# Patient Record
Sex: MALE | Race: WHITE | NOT HISPANIC OR LATINO | Employment: FULL TIME | URBAN - METROPOLITAN AREA
[De-identification: names, ages, dates, MRNs, and addresses within clinical notes are randomized per-mention and may not be internally consistent; named-entity substitution may affect disease eponyms.]

---

## 2021-06-10 ENCOUNTER — HOSPITAL ENCOUNTER (EMERGENCY)
Facility: CLINIC | Age: 46
Discharge: HOME | End: 2021-06-10
Attending: EMERGENCY MEDICINE | Admitting: EMERGENCY MEDICINE
Payer: COMMERCIAL

## 2021-06-10 VITALS
DIASTOLIC BLOOD PRESSURE: 78 MMHG | HEART RATE: 66 BPM | TEMPERATURE: 96.7 F | SYSTOLIC BLOOD PRESSURE: 130 MMHG | OXYGEN SATURATION: 98 % | RESPIRATION RATE: 18 BRPM

## 2021-06-10 DIAGNOSIS — M54.50 ACUTE LEFT-SIDED LOW BACK PAIN WITHOUT SCIATICA: ICD-10-CM

## 2021-06-10 PROCEDURE — 99283 EMERGENCY DEPT VISIT LOW MDM: CPT

## 2021-06-10 PROCEDURE — 250N000013 HC RX MED GY IP 250 OP 250 PS 637: Performed by: EMERGENCY MEDICINE

## 2021-06-10 RX ORDER — METHOCARBAMOL 750 MG/1
750 TABLET, FILM COATED ORAL 3 TIMES DAILY PRN
Qty: 15 TABLET | Refills: 0 | Status: SHIPPED | OUTPATIENT
Start: 2021-06-10 | End: 2021-06-15

## 2021-06-10 RX ORDER — METHOCARBAMOL 750 MG/1
750 TABLET, FILM COATED ORAL ONCE
Status: COMPLETED | OUTPATIENT
Start: 2021-06-10 | End: 2021-06-10

## 2021-06-10 RX ORDER — HYDROCODONE BITARTRATE AND ACETAMINOPHEN 5; 325 MG/1; MG/1
1 TABLET ORAL ONCE
Status: COMPLETED | OUTPATIENT
Start: 2021-06-10 | End: 2021-06-10

## 2021-06-10 RX ADMIN — METHOCARBAMOL 750 MG: 750 TABLET ORAL at 08:58

## 2021-06-10 RX ADMIN — HYDROCODONE BITARTRATE AND ACETAMINOPHEN 1 TABLET: 5; 325 TABLET ORAL at 08:58

## 2021-06-10 ASSESSMENT — ENCOUNTER SYMPTOMS
NUMBNESS: 1
BACK PAIN: 1

## 2021-06-10 NOTE — DISCHARGE INSTRUCTIONS
What do you do next:   Continue your home medications unless we have specifically changed them  You may use the exercises I have listed on your paperwork and see if they help.  Continue taking naproxen (Aleve) with food and follow the directions on the bottle.   Follow up as indicated below    When do you return: If you have uncontrollable pain, severe weakness, complete lack of sensation of any part of your leg, urinary or fecal incontinence, or any other symptoms that concern you, please return to the ED for reevaluation.    Thank you for allowing us to care for you today.

## 2021-06-10 NOTE — ED PROVIDER NOTES
History   Chief Complaint:  Back Pain       The history is provided by the patient.      Esdras Varela is a 46 year old male who presents with left sided back pain. He says that on Saturday he was changing the tires on his car, where he was squatting and lifting the tires. He then felt back soreness the next couple days and today while he was putting his pants on, he jerked his left leg upwards while hunching over and immediately felt lower left back pain and began feeling a sensation of numbness in his left leg (he denies pins or needles). He denies any right sided pains or numbness. He took 2 Aleve afterwards with relief of his pains, but the numbness has persisted.    Review of Systems   Musculoskeletal: Positive for back pain (left side).   Neurological: Positive for numbness (left leg).   All other systems reviewed and are negative.      Allergies:  The patient has no known allergies.     Medications:  Zoloft  Tenormin    Past Medical History:    Mitral click syndrome  Mitral valve prolapse  Mitral insufficiency  Ascending aorta dissection  Varicella  Heart murmur    Past Surgical History:    [place] teeth extraction    Family History:    Colon cancer  DVT/PE  Arrythmia  Pacemaker    Social History:  Patient was accompanied by his wife.  Patient came from home.    Physical Exam     Patient Vitals for the past 24 hrs:   BP Temp Temp src Pulse Resp SpO2   06/10/21 0915 -- -- -- -- -- 98 %   06/10/21 0900 -- -- -- -- -- 99 %   06/10/21 0845 -- -- -- -- -- 96 %   06/10/21 0830 130/78 -- -- 66 -- 99 %   06/10/21 0822 137/74 96.7  F (35.9  C) Temporal 62 18 100 %       Physical Exam  Constitutional: Vital signs reviewed as above.   Head: No external signs of trauma noted.  Eyes: Pupils are equal, round, and reactive to light.   Neck: No JVD noted  Cardiovascular: Normal rate, regular rhythm and normal heart sounds.  Murmur heard loudest at the lateral portion of the patient's left chest corresponding with his  mitral valve issue insufficiency. Equal B/L peripheral pulses.  Pulmonary/Chest: Effort normal and breath sounds normal. No respiratory distress. Patient has no wheezes. Patient has no rales.   Gastrointestinal: Soft. There is no tenderness.   Musculoskeletal/Extremities: No edema noted. Normal tone. No midline T or L spine tenderness. No reproducible left lumbar paraspinal tenderness  Neurological: Patient is alert and oriented to person, place, and time. Patient has normal strength.  No objective sensory deficit noted.   SLR negative B/L   Skin: Skin is warm and dry. There is no diaphoresis noted.   Psychiatric: The patient appears calm.      Emergency Department Course        Emergency Department Course:    Reviewed:  I reviewed nursing notes, vitals, past medical history and care everywhere    Assessments/Consults:     ED Course as of Kunal 10 0940   Thu Kunal 10, 2021   0835 Assessed the patient (obtained history and performed exam).          Interventions:    Medications   methocarbamol (ROBAXIN) tablet 750 mg (750 mg Oral Given 6/10/21 0858)   HYDROcodone-acetaminophen (NORCO) 5-325 MG per tablet 1 tablet (1 tablet Oral Given 6/10/21 0858)       Disposition:  The patient was discharged to home.       Impression & Plan       CMS Diagnoses: None    Medical Decision Making:  This 46-year-old male patient presents to the ED due to back pain.  Please see the HPI and exam for specifics. A broad differential was considered. The potential of a lumbar intervertebral disc herniation causing discomfort on his sciatic nerve was considered.  I do not see any red flags such as weakness, loss of reflexes, objective sensory loss, or reports of urinary incontinence.  The patient noted that he was already feeling better with some over-the-counter naproxen he tried at home though he started to have more symptoms after my physical exam.  He received the other medications listed above and improved.  I do not believe he needs x-ray  imaging at this time.  I think he should follow with his primary care clinic and certainly consider outpatient physical therapy follow-up.  He may return at anytime if he has any new or worsening symptoms.  Anticipatory guidance given to patient and wife prior to discharge.    Covid-19  Esdras Varela was evaluated during a global COVID-19 pandemic, which necessitated consideration that the patient might be at risk for infection with the SARS-CoV-2 virus that causes COVID-19.   Applicable protocols for evaluation were followed during the patient's care.        Diagnosis:    ICD-10-CM    1. Acute left-sided low back pain without sciatica  M54.5        Discharge Medications:  New Prescriptions    METHOCARBAMOL (ROBAXIN) 750 MG TABLET    Take 1 tablet (750 mg) by mouth 3 times daily as needed for muscle spasms       Scribe Disclosure:  I, Marco A Minaya, am serving as a scribe at 8:25 AM on 6/10/2021 to document services personally performed by Elbert Casillas DO based on my observations and the provider's statements to me.            Elbert Casillas DO  06/10/21 0970

## 2021-06-10 NOTE — ED TRIAGE NOTES
Pt arrives with c/o left sided back pain that started suddenly this morning while putting pants on. Patient reports now having some left sided numbness down into his left leg. Pt denies loss of bowel or bladder. Pt took 2 aleve this AM. ABCs intact.

## 2024-12-16 ENCOUNTER — TRANSCRIBE ORDERS (OUTPATIENT)
Dept: OTHER | Age: 49
End: 2024-12-16

## 2024-12-16 DIAGNOSIS — Z98.890 S/P MITRAL VALVE REPAIR: Primary | ICD-10-CM

## 2024-12-18 ENCOUNTER — HOSPITAL ENCOUNTER (OUTPATIENT)
Dept: CARDIAC REHAB | Facility: CLINIC | Age: 49
Discharge: HOME | End: 2024-12-18
Payer: COMMERCIAL

## 2024-12-18 PROCEDURE — 93798 PHYS/QHP OP CAR RHAB W/ECG: CPT

## 2024-12-18 PROCEDURE — 93797 PHYS/QHP OP CAR RHAB WO ECG: CPT

## 2024-12-20 ENCOUNTER — HOSPITAL ENCOUNTER (EMERGENCY)
Facility: CLINIC | Age: 49
Discharge: HOME | End: 2024-12-21
Attending: EMERGENCY MEDICINE | Admitting: FAMILY MEDICINE
Payer: COMMERCIAL

## 2024-12-20 ENCOUNTER — APPOINTMENT (OUTPATIENT)
Dept: GENERAL RADIOLOGY | Facility: CLINIC | Age: 49
End: 2024-12-20
Attending: EMERGENCY MEDICINE
Payer: COMMERCIAL

## 2024-12-20 ENCOUNTER — HOSPITAL ENCOUNTER (EMERGENCY)
Facility: CLINIC | Age: 49
Discharge: OTHER INSTITUTION | End: 2024-12-20
Attending: EMERGENCY MEDICINE | Admitting: EMERGENCY MEDICINE
Payer: COMMERCIAL

## 2024-12-20 ENCOUNTER — APPOINTMENT (OUTPATIENT)
Dept: CT IMAGING | Facility: CLINIC | Age: 49
End: 2024-12-20
Attending: EMERGENCY MEDICINE
Payer: COMMERCIAL

## 2024-12-20 ENCOUNTER — HOSPITAL ENCOUNTER (OUTPATIENT)
Dept: CARDIAC REHAB | Facility: CLINIC | Age: 49
Discharge: HOME | End: 2024-12-20
Attending: THORACIC SURGERY (CARDIOTHORACIC VASCULAR SURGERY)
Payer: COMMERCIAL

## 2024-12-20 VITALS
SYSTOLIC BLOOD PRESSURE: 103 MMHG | DIASTOLIC BLOOD PRESSURE: 52 MMHG | WEIGHT: 227.74 LBS | RESPIRATION RATE: 18 BRPM | HEIGHT: 70 IN | TEMPERATURE: 97.8 F | BODY MASS INDEX: 32.6 KG/M2 | HEART RATE: 75 BPM | OXYGEN SATURATION: 93 %

## 2024-12-20 DIAGNOSIS — R93.89 ABNORMAL CXR: ICD-10-CM

## 2024-12-20 DIAGNOSIS — H53.122 TRANSIENT VISUAL LOSS OF LEFT EYE: ICD-10-CM

## 2024-12-20 DIAGNOSIS — Z79.01 CHRONIC ANTICOAGULATION: ICD-10-CM

## 2024-12-20 DIAGNOSIS — D64.9 ANEMIA, UNSPECIFIED TYPE: ICD-10-CM

## 2024-12-20 DIAGNOSIS — I63.40 CEREBRAL INFARCTION DUE TO EMBOLISM OF CEREBRAL ARTERY (H): ICD-10-CM

## 2024-12-20 DIAGNOSIS — H53.121 TRANSIENT VISUAL LOSS OF RIGHT EYE: ICD-10-CM

## 2024-12-20 LAB
ABO + RH BLD: NORMAL
ANION GAP SERPL CALCULATED.3IONS-SCNC: 12 MMOL/L (ref 7–15)
ATRIAL RATE - MUSE: 71 BPM
BASOPHILS # BLD AUTO: 0.1 10E3/UL (ref 0–0.2)
BASOPHILS NFR BLD AUTO: 1 %
BLD GP AB SCN SERPL QL: NEGATIVE
BUN SERPL-MCNC: 15 MG/DL (ref 6–20)
CALCIUM SERPL-MCNC: 9.5 MG/DL (ref 8.8–10.4)
CHLORIDE SERPL-SCNC: 98 MMOL/L (ref 98–107)
CREAT SERPL-MCNC: 1.24 MG/DL (ref 0.67–1.17)
DIASTOLIC BLOOD PRESSURE - MUSE: NORMAL MMHG
EGFRCR SERPLBLD CKD-EPI 2021: 70 ML/MIN/1.73M2
EOSINOPHIL # BLD AUTO: 0.1 10E3/UL (ref 0–0.7)
EOSINOPHIL NFR BLD AUTO: 1 %
ERYTHROCYTE [DISTWIDTH] IN BLOOD BY AUTOMATED COUNT: 13.5 % (ref 10–15)
GLUCOSE SERPL-MCNC: 91 MG/DL (ref 70–99)
HCO3 SERPL-SCNC: 25 MMOL/L (ref 22–29)
HCT VFR BLD AUTO: 24.8 % (ref 40–53)
HGB BLD-MCNC: 10.2 G/DL (ref 13.3–17.7)
HGB BLD-MCNC: 8.2 G/DL (ref 13.3–17.7)
HOLD SPECIMEN: NORMAL
IMM GRANULOCYTES # BLD: 0 10E3/UL
IMM GRANULOCYTES NFR BLD: 0 %
INR PPP: 1.98 (ref 0.85–1.15)
INR PPP: 2.06 (ref 0.85–1.15)
INTERPRETATION ECG - MUSE: NORMAL
LYMPHOCYTES # BLD AUTO: 1.5 10E3/UL (ref 0.8–5.3)
LYMPHOCYTES NFR BLD AUTO: 19 %
MCH RBC QN AUTO: 31.3 PG (ref 26.5–33)
MCHC RBC AUTO-ENTMCNC: 33.1 G/DL (ref 31.5–36.5)
MCV RBC AUTO: 95 FL (ref 78–100)
MONOCYTES # BLD AUTO: 0.8 10E3/UL (ref 0–1.3)
MONOCYTES NFR BLD AUTO: 10 %
NEUTROPHILS # BLD AUTO: 5.4 10E3/UL (ref 1.6–8.3)
NEUTROPHILS NFR BLD AUTO: 68 %
NRBC # BLD AUTO: 0 10E3/UL
NRBC BLD AUTO-RTO: 0 /100
P AXIS - MUSE: 43 DEGREES
PLATELET # BLD AUTO: 406 10E3/UL (ref 150–450)
POTASSIUM SERPL-SCNC: 4.2 MMOL/L (ref 3.4–5.3)
PR INTERVAL - MUSE: 202 MS
QRS DURATION - MUSE: 106 MS
QT - MUSE: 424 MS
QTC - MUSE: 460 MS
R AXIS - MUSE: -33 DEGREES
RBC # BLD AUTO: 2.62 10E6/UL (ref 4.4–5.9)
SODIUM SERPL-SCNC: 135 MMOL/L (ref 135–145)
SPECIMEN EXP DATE BLD: NORMAL
SYSTOLIC BLOOD PRESSURE - MUSE: NORMAL MMHG
T AXIS - MUSE: 108 DEGREES
TROPONIN T SERPL HS-MCNC: 108 NG/L
TROPONIN T SERPL HS-MCNC: 127 NG/L
TROPONIN T SERPL HS-MCNC: 134 NG/L
VENTRICULAR RATE- MUSE: 71 BPM
WBC # BLD AUTO: 7.9 10E3/UL (ref 4–11)

## 2024-12-20 PROCEDURE — 250N000013 HC RX MED GY IP 250 OP 250 PS 637: Performed by: EMERGENCY MEDICINE

## 2024-12-20 PROCEDURE — 83718 ASSAY OF LIPOPROTEIN: CPT | Performed by: EMERGENCY MEDICINE

## 2024-12-20 PROCEDURE — 93798 PHYS/QHP OP CAR RHAB W/ECG: CPT

## 2024-12-20 PROCEDURE — 99207 PR NO CHARGE LOS: CPT | Performed by: NURSE PRACTITIONER

## 2024-12-20 PROCEDURE — 250N000011 HC RX IP 250 OP 636: Performed by: EMERGENCY MEDICINE

## 2024-12-20 PROCEDURE — 250N000009 HC RX 250: Performed by: EMERGENCY MEDICINE

## 2024-12-20 PROCEDURE — 99285 EMERGENCY DEPT VISIT HI MDM: CPT | Mod: 25

## 2024-12-20 PROCEDURE — 93005 ELECTROCARDIOGRAM TRACING: CPT | Mod: RTG

## 2024-12-20 PROCEDURE — 85004 AUTOMATED DIFF WBC COUNT: CPT | Performed by: EMERGENCY MEDICINE

## 2024-12-20 PROCEDURE — 70450 CT HEAD/BRAIN W/O DYE: CPT

## 2024-12-20 PROCEDURE — 84484 ASSAY OF TROPONIN QUANT: CPT | Performed by: EMERGENCY MEDICINE

## 2024-12-20 PROCEDURE — 70496 CT ANGIOGRAPHY HEAD: CPT | Mod: 59

## 2024-12-20 PROCEDURE — 76512 OPH US DX B-SCAN: CPT | Mod: 26 | Performed by: FAMILY MEDICINE

## 2024-12-20 PROCEDURE — 71046 X-RAY EXAM CHEST 2 VIEWS: CPT

## 2024-12-20 PROCEDURE — 85041 AUTOMATED RBC COUNT: CPT | Performed by: EMERGENCY MEDICINE

## 2024-12-20 PROCEDURE — 76512 OPH US DX B-SCAN: CPT | Mod: RT | Performed by: FAMILY MEDICINE

## 2024-12-20 PROCEDURE — 99285 EMERGENCY DEPT VISIT HI MDM: CPT | Mod: 25 | Performed by: FAMILY MEDICINE

## 2024-12-20 PROCEDURE — 36415 COLL VENOUS BLD VENIPUNCTURE: CPT | Performed by: EMERGENCY MEDICINE

## 2024-12-20 PROCEDURE — 86901 BLOOD TYPING SEROLOGIC RH(D): CPT | Performed by: EMERGENCY MEDICINE

## 2024-12-20 PROCEDURE — 85610 PROTHROMBIN TIME: CPT | Performed by: EMERGENCY MEDICINE

## 2024-12-20 PROCEDURE — 99284 EMERGENCY DEPT VISIT MOD MDM: CPT | Mod: 25 | Performed by: FAMILY MEDICINE

## 2024-12-20 PROCEDURE — 82465 ASSAY BLD/SERUM CHOLESTEROL: CPT | Performed by: EMERGENCY MEDICINE

## 2024-12-20 PROCEDURE — 85018 HEMOGLOBIN: CPT | Performed by: EMERGENCY MEDICINE

## 2024-12-20 PROCEDURE — 85048 AUTOMATED LEUKOCYTE COUNT: CPT | Performed by: EMERGENCY MEDICINE

## 2024-12-20 PROCEDURE — 80048 BASIC METABOLIC PNL TOTAL CA: CPT | Performed by: EMERGENCY MEDICINE

## 2024-12-20 RX ORDER — METOPROLOL TARTRATE 25 MG/1
25 TABLET, FILM COATED ORAL 2 TIMES DAILY
COMMUNITY
Start: 2024-12-12 | End: 2025-01-11

## 2024-12-20 RX ORDER — METOPROLOL TARTRATE 25 MG/1
25 TABLET, FILM COATED ORAL ONCE
Status: COMPLETED | OUTPATIENT
Start: 2024-12-20 | End: 2024-12-20

## 2024-12-20 RX ORDER — WARFARIN SODIUM 2 MG/1
TABLET ORAL
COMMUNITY
Start: 2024-12-12

## 2024-12-20 RX ORDER — AMIODARONE HYDROCHLORIDE 200 MG/1
200 TABLET ORAL DAILY
COMMUNITY
Start: 2024-12-13 | End: 2025-01-09

## 2024-12-20 RX ORDER — IOPAMIDOL 755 MG/ML
500 INJECTION, SOLUTION INTRAVASCULAR ONCE
Status: COMPLETED | OUTPATIENT
Start: 2024-12-20 | End: 2024-12-20

## 2024-12-20 RX ORDER — WARFARIN SODIUM 1 MG/1
4 TABLET ORAL ONCE
Status: COMPLETED | OUTPATIENT
Start: 2024-12-20 | End: 2024-12-20

## 2024-12-20 RX ORDER — COLCHICINE 0.6 MG/1
0.3 TABLET ORAL DAILY
COMMUNITY
Start: 2024-12-12

## 2024-12-20 RX ORDER — ASPIRIN 81 MG/1
81 TABLET, CHEWABLE ORAL DAILY
COMMUNITY
Start: 2024-12-13

## 2024-12-20 RX ORDER — TRAZODONE HYDROCHLORIDE 50 MG/1
50-100 TABLET, FILM COATED ORAL AT BEDTIME
COMMUNITY
Start: 2024-12-16

## 2024-12-20 RX ORDER — ACETAMINOPHEN 500 MG
1000 TABLET ORAL ONCE
Status: COMPLETED | OUTPATIENT
Start: 2024-12-20 | End: 2024-12-20

## 2024-12-20 RX ADMIN — WARFARIN SODIUM 4 MG: 1 TABLET ORAL at 21:08

## 2024-12-20 RX ADMIN — ACETAMINOPHEN 1000 MG: 500 TABLET, FILM COATED ORAL at 16:15

## 2024-12-20 RX ADMIN — IOPAMIDOL 67 ML: 755 INJECTION, SOLUTION INTRAVENOUS at 12:52

## 2024-12-20 RX ADMIN — SODIUM CHLORIDE 100 ML: 9 INJECTION, SOLUTION INTRAVENOUS at 12:52

## 2024-12-20 RX ADMIN — METOPROLOL TARTRATE 25 MG: 25 TABLET, FILM COATED ORAL at 22:41

## 2024-12-20 ASSESSMENT — ACTIVITIES OF DAILY LIVING (ADL)
ADLS_ACUITY_SCORE: 41

## 2024-12-20 ASSESSMENT — COLUMBIA-SUICIDE SEVERITY RATING SCALE - C-SSRS
2. HAVE YOU ACTUALLY HAD ANY THOUGHTS OF KILLING YOURSELF IN THE PAST MONTH?: NO
6. HAVE YOU EVER DONE ANYTHING, STARTED TO DO ANYTHING, OR PREPARED TO DO ANYTHING TO END YOUR LIFE?: NO
1. IN THE PAST MONTH, HAVE YOU WISHED YOU WERE DEAD OR WISHED YOU COULD GO TO SLEEP AND NOT WAKE UP?: NO

## 2024-12-20 NOTE — ED NOTES
Pt being transferred to the [facility] via [facility]. Vitally stable, alert, oriented and ambulatory at time of transfer.

## 2024-12-20 NOTE — ED NOTES
Pt resting in bed after imaging completed. Appears comfortable at this time, family member at bedside.

## 2024-12-20 NOTE — CONSULTS
"  [facility]    Stroke Telephone Note    I was called by Pancho Palomo on 12/20/24 regarding patient Esdras Varela. The patient is a 49 year old male with PMH of recent AAA repair + AV replacement ([place], discharged 12/5) on Coumadin. He presents to the ED for evaluation of visual disturbance x2. The first episode occurred yesterday afternoon when he lost vision in the inferior half of his L eye only; symptoms resolved after approximately 5 minutes. Then today around 0920 he had another episode of inferior visual loss in L eye only; this also resolved after a couple minutes. He denies other symptoms. Per ED provider no deficits on current exam. BP 97/66.    Vitals  BP: 97/66   Pulse: 70   Resp: 18   Temp: 97.8  F (36.6  C)   Weight: 103.3 kg (227 lb 11.8 oz)    Imaging Findings  Pending     Impression  Inferior visual loss in L eye x 2    Recommendations  -CT head + CTA head/neck    Case discussed with vascular neurology attending Dr. Hardy.    My recommendations are based on the information provided over the phone by Esdras Varela's in-person providers. They are not intended to replace the clinical judgment of his in-person providers. I was not requested to personally see or examine the patient at this time.     LOVE Mueller CNP  Vascular Neurology    To page me or covering stroke neurology team member, click here: AMCOM  Choose \"On Call\" tab at top, then select \"NEUROLOGY/ALL SITES\" from middle drop-down box, press Enter, then look for \"stroke\" or \"telestroke\" for your site.   "

## 2024-12-20 NOTE — ED TRIAGE NOTES
Patient arrives to ED with complains of a sudden loss of vision in his lower L eye. At 9:30 AM. Of note, patient reports a significant cardiac history including open heart surgery 12/5/24, aortic valve replacement, and mitral valve replacement. Patient is pale and dizzy in triage. On anticoagulation. MD evaluated in triage.

## 2024-12-20 NOTE — ED PROVIDER NOTES
Emergency Department Note      History of Present Illness     Chief Complaint   Loss of Vision      HPI   Esdras Varela is a 49 year old male, anticoagulated on Coumadin, with a history of aneurysm of ascending aorta, dilated aortic root, left bundle branch block, CHF, mitral valve prolapse, and heart murmur who presents to the ED for loss of vision. The patient reports having a 5 minute episode of vision loss in the lower left eye yesterday. He adds that he still had vision in his upper left eye, and the vision returned to his lower left eye after walking around for 5 minutes. Patient then had another episode of vision loss in his lower left eye today at 0920/0930 while he was at his cardiac rehab appointment. Patient states that he currently feels fine and has no symptoms. He denies any numbness/weakness to bilateral upper/lower extremities, headache, or neck pain, though he does note he gets mildly lightheaded when standing. Further denies any history of similar symptoms or history of stroke. He does report recent heart surgery at [facility], and states he is on coumadin. Last INR from yesterday is 1.9. Patient wears reading glasses, but no contact lenses.    Independent Historian   None    Review of External Notes   Reviewed hospital discharge summary from [facility] on 12/12/2024 where patient underwent aortic repair, mitral and aortic valve replacement    Past Medical History     Medical History and Problem List   Thrombocytopenia secondary  Anemia  Aneurysm of ascending aorta  Dilated aortic root  Atelectasis  Left bundle branch block  CHF ejection fraction less than 40%  Excess fluid volume  GERD  Hypertensive heart disease  Hypokalemia  Mitral valve prolapse  Heart murmur  Nonrheumatic mitral valve insufficiency  Performance anxiety  Pleural effusion  Mitral click-murmur syndrome  Varicella    Medications  "  Tylenol  Pacerone  ASA  Tenormin  Colcrys  Lasix  Neurontin  Ativan  Lopressor  Desyrel  Coumadin     Surgical History   [place] tooth extraction   Repair mitral valve  Repair aneurysm ascending aorta - valve sparing root  Isolation pulmonary vein  Ligation left atrial appendage  Echocardiogram transesophageal  [place] root replacement - aortic valve    Physical Exam     Patient Vitals for the past 24 hrs:   BP Temp Temp src Pulse Resp SpO2 Height Weight   12/20/24 1615 98/58 -- -- 76 18 100 % -- --   12/20/24 1509 -- -- -- -- -- 98 % -- --   12/20/24 1508 -- -- -- -- -- 97 % -- --   12/20/24 1507 -- -- -- -- -- 97 % -- --   12/20/24 1506 -- -- -- -- -- 98 % -- --   12/20/24 1505 -- -- -- -- -- 97 % -- --   12/20/24 1504 -- -- -- -- -- 98 % -- --   12/20/24 1455 100/61 -- -- 69 18 99 % -- --   12/20/24 1330 98/61 -- -- 69 16 98 % -- --   12/20/24 1309 97/50 -- -- 78 18 97 % -- --   12/20/24 1225 101/59 -- -- 71 18 100 % -- --   12/20/24 1112 97/66 -- -- 70 18 100 % -- --   12/20/24 1059 107/66 97.8  F (36.6  C) Temporal 64 20 97 % 1.778 m (5' 10\") 103.3 kg (227 lb 11.8 oz)     Physical Exam  General:   Well-nourished   Speaking in full sentences  Eyes:   Conjunctiva without injection or scleral icterus   Pupils: 4 on R and 4 on L, round, reactive to light and accomodation   No field cut on confrontation field testing  ENT:   Moist mucous membranes   Posterior oropharynx clear without erythema or exudate  Neck:   Supple with full ROM  Resp:   Lungs CTAB   No crackles, wheezing or audible rubs   Good air movement  CV:    Normal rate, regular rhythm   S1 and S2 present   No murmur, gallop or rub  GI:   BS present   Abdomen soft without distention   Non-tender to light and deep palpation   No guarding or rebound tenderness  Skin:   Warm, dry, well perfused   No rashes or open wounds on exposed skin   Well healed upper abdominal and chest scars  MSK:   Moves all extremities   No focal deformities or " swelling  Neuro:   Alert   CN III-XII intact   5/5 strength bilaterally to hand , elbow flexion/extension   5/5 strength bilaterally to ankle dorsi/plantarflexion, hip flexion   SILT in BUE and BLE   Normal gait  Psych:   Normal affect, normal mood        Diagnostics     Lab Results   Labs Ordered and Resulted from Time of ED Arrival to Time of ED Departure   INR - Abnormal       Result Value    INR 1.98 (*)    BASIC METABOLIC PANEL - Abnormal    Sodium 135      Potassium 4.2      Chloride 98      Carbon Dioxide (CO2) 25      Anion Gap 12      Urea Nitrogen 15.0      Creatinine 1.24 (*)     GFR Estimate 70      Calcium 9.5      Glucose 91     TROPONIN T, HIGH SENSITIVITY - Abnormal    Troponin T, High Sensitivity 134 (*)    CBC WITH PLATELETS AND DIFFERENTIAL - Abnormal    WBC Count 7.9      RBC Count 2.62 (*)     Hemoglobin 8.2 (*)     Hematocrit 24.8 (*)     MCV 95      MCH 31.3      MCHC 33.1      RDW 13.5      Platelet Count 406      % Neutrophils 68      % Lymphocytes 19      % Monocytes 10      % Eosinophils 1      % Basophils 1      % Immature Granulocytes 0      NRBCs per 100 WBC 0      Absolute Neutrophils 5.4      Absolute Lymphocytes 1.5      Absolute Monocytes 0.8      Absolute Eosinophils 0.1      Absolute Basophils 0.1      Absolute Immature Granulocytes 0.0      Absolute NRBCs 0.0     TROPONIN T, HIGH SENSITIVITY - Abnormal    Troponin T, High Sensitivity 127 (*)    HEMOGLOBIN - Abnormal    Hemoglobin 10.2 (*)    TYPE AND SCREEN, ADULT    ABO/RH(D) O POS      Antibody Screen Negative      SPECIMEN EXPIRATION DATE [id number]     ABO/RH TYPE AND SCREEN       Imaging   Chest XR,  PA & LAT   Final Result   IMPRESSION: Patchy bibasilar opacities left greater than right may   represent atelectasis. Correlate clinically with any evidence of   airspace disease such as pneumonia. Borderline cardiomegaly.   Sternotomy changes. No effusion or pneumothorax.      TONY MENSAH MD            SYSTEM ID:   [id number]      CTA Head Neck with Contrast   Final Result   IMPRESSION: Patent arteries in the head and neck without vascular   cutoff. No evidence of dissection. No aneurysm identified. No   significant stenosis.      JO GALINDO MD            SYSTEM ID:  [id number]      Head CT w/o contrast   Final Result   IMPRESSION:   No evidence of acute intracranial hemorrhage, mass, or   herniation.       JO GALINDO MD            SYSTEM ID:  [id number]      POC US SOFT TISSUE   Final Result      Limited Bedside ED Ultrasound for Ocular complaints Procedure Note:      PROCEDURE: PERFORMED BY: Dr. Pancho Palomo MD   INDICATIONS/SYMPTOM: visual field cut   PROBE: High frequency linear probe   FINDINGS:   Left eye:       Lens location: Normal    Retinal detachment: Absent    Posterior chamber hemorrhage: Absent    Intraocular foreign body: Absent      INTERPRETATION: No evidence of retinal detachment on bedside US   IMAGE DOCUMENTATION: Images were archived to PACs system.                EKG   ECG taken at 1109, ECG read at 1111  Normal sinus rhythm with sinus arrhythmia  Left axis deviation  Nonspecific T wave abnormality  Abnormal ECG   No significant change as compared to prior, dated 12/12/24.  Rate 71 bpm. SC interval 202 ms. QRS duration 106 ms. QT/QTc 424/460 ms. P-R-T axes 43 -33 108.    Independent Interpretation   CXR: No pneumothorax or pleural effusion.  CT Head: No intracranial hemorrhage.    ED Course      Medications Administered   Medications   sodium chloride 0.9 % bag 100 mL for CT scan flush use (100 mLs As instructed $Given 12/20/24 1252)   iopamidol (ISOVUE-370) solution 500 mL (67 mLs Intravenous $Given 12/20/24 1252)   acetaminophen (TYLENOL) tablet 1,000 mg (1,000 mg Oral $Given 12/20/24 1615)       Procedures   Procedures     Discussion of Management   See below.    ED Course   ED Course as of 12/20/24 1701   Fri Dec 20, 2024   1055 I obtained history and examined the patient as noted above.      1122 I spoke with Marilyn Cid of stroke neuro regarding the patient's presentation and plan of care.   1213 Patient reevaluated.  Bedside ultrasound performed.  No evidence of retinal tear or posterior vitreous hemorrhage.  He denies any symptoms of chest pain, chest pressure no shortness of breath.  Bedside ultrasound reveals trace pericardial effusion though no RV dilation.  No free intra-abdominal fluid.   1328 I spoke with Shoaib of [facility]acic Surgery Team regarding the patient's presentation and plan of care.   1411 I spoke with Marilyn Cid of strok neuro regarding patient's presentation and plan of care.   1457 I spoke with Marilyn Cid of strok neuro regarding patient's presentation and plan of care.   1517 I spoke with Dr. Alexandre Johnson of [facility] regarding patient's IN system transfer.   1519 I rechecked the patient and updated them on IN system transfer.   1557 I rechecked the patient and discussed different possible placements for transfer. Patient states he is unable to transfer to [facility] because it is not covered by his insurance.   1604 I spoke with [facility] regarding patient's IN system transfer. They denied patient transfer.   1629 Spoke with [facility] ED.  They will call back   1642 [facility] unable to accept patient in transfer.  Patient again confirmed with his insurance company and acknowledges that this should be covered to go to the [facility]       Additional Documentation  None    Medical Decision Making / Diagnosis     CMS Diagnoses: None    MIPS       None    [facility]   Esdras Varela is a 49 year old male with a complex past medical history presenting to the emergency department for evaluation of vision loss.  VS on presentation reveal no acute abnormalities.  At the time of my evaluation, patient symptoms had resolved completely.  No additional neurologic symptoms are noted.  CT/CTA was pursued, revealing no evidence of ICH, large vessel occlusion or  other acute abnormality.  I reviewed case and clinical history with stroke neurology.  Greatly appreciate their assistance.  Please refer to their associated documentation.  Differential diagnosis does include retinal ischemia, ischemic optic neuropathy, retinal pathology, as well as CVA/TIA.  At this time, recommendations have been made for transfer to a facility with ophthalmologic evaluation for detailed retinal exam for further evaluation.  Further consideration for CNS ischemia can be undertaken with brain MRI.  From a cardiothoracic standpoint, the patient denies any symptoms of chest pain, chest pressure nor shortness of breath.  EKG demonstrates sinus rhythm, T wave inversion in lead I, aVL, as well as T wave inversion in lead V2, though these are not grossly changed compared with previous.  Initial high-sensitivity troponin did return elevated at 134, with repeat testing downtrending at 127.  Clinically, I suspect this most likely is secondary to recent cardiothoracic surgery, and less likely related to ACS in the absence of symptoms as well as above history.  This can be further trended.  Chest x-ray was also obtained, demonstrating patchy bibasilar opacities, left greater than right which may represent atelectasis.  On review of outside records, similar infiltrates had been noted on x-ray from 12/16/2024.  In the absence of cough, fever, or other infectious symptoms, low suspicion for pneumonia.  His laboratory evaluation is notable for hemoglobin of 8.2, down from previous values near 10.  On recheck, this did improved to 10.2, for which I question if this may have been a spurious level.  No other signs or symptoms of acute blood loss anemia or detected by history or examination.    Results and clinical pression discussed with patient and spouse present at bedside.  Unfortunately, in the absence of cardiothoracic or ophthalmology specialties at this facility, patient will require transfer.  We initially  secured a bed in the ED at the [facility] who has graciously accepted the patient in transfer.  There was initial concern over whether or not his insurance would cover this as in-network, for which additional hospitals were contacted regarding the possibility of transfer.  Ultimately, patient is connected with his insurance company who affirmed that the [facility] should be within network.  As such, will plan transfer to ED via EMS for further evaluation and care.  Case discussed with patient and spouse as well as accepting ED physician.    Disposition   The patient was transferred to [facility] via EMS. Dr. Castillo accepted the patient for transfer.     Diagnosis     ICD-10-CM    1. Transient visual loss of left eye  H53.122       2. Chronic anticoagulation  Z79.01       3. Anemia, unspecified type  D64.9       4. Abnormal CXR  R93.89            Scribe Disclosure:  I, Ning Saravia, am serving as a scribe at 11:44 AM on 12/20/2024 to document services personally performed by Pancho Palomo MD based on my observations and the provider's statements to me.        Pancho Palomo MD  12/20/24 1701       Pancho Palomo MD  12/20/24 1701       Pancho Palomo MD  12/20/24 1908

## 2024-12-20 NOTE — PHARMACY-ADMISSION MEDICATION HISTORY
Pharmacy Intern Admission Medication History    Admission medication history is complete. The information provided in this note is only as accurate as the sources available at the time of the update.    Information Source(s): Patient, Hospital records, and CareEverywhere/SureScripts via in-person    Pertinent Information: Recent fill history for Furosemide and Potassium but the patient confirmed he is no longer on either.     Changes made to PTA medication list:  Added: All  Deleted: None  Changed: None    Allergies reviewed with patient and updates made in EHR: yes    Medication History Completed By: Skye Monte RPH 12/20/2024 2:49 PM    PTA Med List   Medication Sig Last Dose/Taking    amiodarone (PACERONE) 200 MG tablet Take 200 mg by mouth daily. 12/20/2024 Morning    aspirin (ASA) 81 MG chewable tablet Take 81 mg by mouth daily. 12/20/2024 Morning    colchicine (COLCRYS) 0.6 MG tablet Take 0.3 mg by mouth daily. 12/20/2024 Morning    metoprolol tartrate (LOPRESSOR) 25 MG tablet Take 25 mg by mouth 2 times daily. 12/20/2024 Morning    traZODone (DESYREL) 50 MG tablet Take  mg by mouth at bedtime. 12/19/2024 Bedtime    warfarin ANTICOAGULANT (COUMADIN) 2 MG tablet Take 2 mg by mouth See Admin Instructions. 3mg Sunday and Wednesday; and 4mg all other days 12/19/2024 Evening

## 2024-12-21 ENCOUNTER — APPOINTMENT (OUTPATIENT)
Dept: MRI IMAGING | Facility: CLINIC | Age: 49
End: 2024-12-21
Attending: EMERGENCY MEDICINE
Payer: COMMERCIAL

## 2024-12-21 ENCOUNTER — TELEPHONE (OUTPATIENT)
Dept: NURSING | Facility: CLINIC | Age: 49
End: 2024-12-21

## 2024-12-21 VITALS
WEIGHT: 220 LBS | HEART RATE: 70 BPM | RESPIRATION RATE: 20 BRPM | DIASTOLIC BLOOD PRESSURE: 65 MMHG | TEMPERATURE: 98.5 F | HEIGHT: 70 IN | BODY MASS INDEX: 31.5 KG/M2 | SYSTOLIC BLOOD PRESSURE: 105 MMHG | OXYGEN SATURATION: 100 %

## 2024-12-21 LAB
CHOLEST SERPL-MCNC: 123 MG/DL
EST. AVERAGE GLUCOSE BLD GHB EST-MCNC: 120 MG/DL
HBA1C MFR BLD: 5.8 %
HDLC SERPL-MCNC: 27 MG/DL
HOLD SPECIMEN: NORMAL
INR PPP: 2.35 (ref 0.85–1.15)
LDLC SERPL CALC-MCNC: 79 MG/DL
NONHDLC SERPL-MCNC: 96 MG/DL
TRIGL SERPL-MCNC: 87 MG/DL
TROPONIN T SERPL HS-MCNC: 108 NG/L

## 2024-12-21 PROCEDURE — 70553 MRI BRAIN STEM W/O & W/DYE: CPT

## 2024-12-21 PROCEDURE — A9585 GADOBUTROL INJECTION: HCPCS | Performed by: EMERGENCY MEDICINE

## 2024-12-21 PROCEDURE — 70553 MRI BRAIN STEM W/O & W/DYE: CPT | Mod: 26 | Performed by: RADIOLOGY

## 2024-12-21 PROCEDURE — 70544 MR ANGIOGRAPHY HEAD W/O DYE: CPT

## 2024-12-21 PROCEDURE — 36415 COLL VENOUS BLD VENIPUNCTURE: CPT | Performed by: EMERGENCY MEDICINE

## 2024-12-21 PROCEDURE — 83036 HEMOGLOBIN GLYCOSYLATED A1C: CPT | Performed by: EMERGENCY MEDICINE

## 2024-12-21 PROCEDURE — 70549 MR ANGIOGRAPH NECK W/O&W/DYE: CPT

## 2024-12-21 PROCEDURE — 250N000013 HC RX MED GY IP 250 OP 250 PS 637: Performed by: EMERGENCY MEDICINE

## 2024-12-21 PROCEDURE — 85610 PROTHROMBIN TIME: CPT | Performed by: EMERGENCY MEDICINE

## 2024-12-21 PROCEDURE — 255N000002 HC RX 255 OP 636: Performed by: EMERGENCY MEDICINE

## 2024-12-21 PROCEDURE — 70549 MR ANGIOGRAPH NECK W/O&W/DYE: CPT | Mod: 26 | Performed by: RADIOLOGY

## 2024-12-21 RX ORDER — ACETAMINOPHEN 325 MG/1
650 TABLET ORAL ONCE
Status: COMPLETED | OUTPATIENT
Start: 2024-12-21 | End: 2024-12-21

## 2024-12-21 RX ORDER — GADOBUTROL 604.72 MG/ML
10 INJECTION INTRAVENOUS ONCE
Status: COMPLETED | OUTPATIENT
Start: 2024-12-21 | End: 2024-12-21

## 2024-12-21 RX ORDER — ACETAMINOPHEN 500 MG
1000 TABLET ORAL ONCE
Status: COMPLETED | OUTPATIENT
Start: 2024-12-21 | End: 2024-12-21

## 2024-12-21 RX ADMIN — ACETAMINOPHEN 650 MG: 325 TABLET, FILM COATED ORAL at 00:20

## 2024-12-21 RX ADMIN — ACETAMINOPHEN 1000 MG: 500 TABLET ORAL at 06:37

## 2024-12-21 RX ADMIN — GADOBUTROL 10 ML: 604.72 INJECTION INTRAVENOUS at 04:15

## 2024-12-21 ASSESSMENT — ACTIVITIES OF DAILY LIVING (ADL)
ADLS_ACUITY_SCORE: 41

## 2024-12-21 NOTE — CONSULTS
[facility]    Stroke Consult Note    Reason for Consult: Left eye vision loss episodes    Chief Complaint: Eye Problem (Left eye)       HPI  Esdras Varela is a 49 year old male with recent ascending aneurysm repair, as well as mitral and aortic valve replacements (12/5) now on warfarin, left bundle branch block, CHF, mitral valve prolapse, and heart murmur , who presents to the emergency department for vision loss.  Stroke neurology was consulted after Optho raised concern for possibility of left eye BRAO.    Patient described that on 12/19 he had his first episode of acute painless left vision loss in his inferior aspect of visual field that lasted for around 5 minutes that resolved on its own.  He had another similar episode the next day while he was walking on the treadmill with cardiac rehab that also resolved on its own.  He described that the inferior aspect of his vision went gray.  He did not have any pain with extraocular movements.  He did not have any other acute neurologic deficits including numbness, tingling, dizziness, headache, gait or balance issues, speech difficulties.  He had CT head and CTA head and neck done at outside hospital, which were unremarkable as well.    Patient has been on warfarin and most recently his INR was less than 2.  3 days back his warfarin dose was modified.  Per patient, cardiology also recommended him to start aspirin but he has not been taking it religiously.      MRI brain  IMPRESSION:  1.  Multiple (at least four) tiny subacute infarcts in the bilateral frontal lobes. Given enhancement of one of the infarcts, recommend follow-up brain MRI in eight weeks to document expected resolution. Appearance is most suggestive of an embolic   etiology.    MRI C-spine  IMPRESSION:  1.  No measurable stenosis of the cervical arterial vasculature.    IMPRESSION:  1.  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  Per ophthalmology exam, tiny refractile arterial deposit as the retinal arteries exit the optic nerve head concerning for embolus.  He had CT head and CTA head and neck done at outside hospital, which were unremarkable.  We recommended MRI brain that revealed tiny subacute embolic infarcts in the bilateral frontal lobes.  MRA head and neck did not reveal any internal carotid artery stenosis or plaques.  Given that patient had recent cardiac surgery, the embolic strokes are likely incidental and silent.  Regardless, patient is already on anticoagulation coverage with warfarin.  We recommend continuing that.Per patient, cardiology also recommended him to start aspirin but he has not been taking it religiously.  We recommend continuing aspirin as well for secondary prevention.  Rest of the stroke workup as below and outpatient stroke neurology follow-up in 6 to 8 weeks.    Recommendations   -MRI brain and MRA head and neck-resulted as above  -LDL and A1c  -Atorvastatin 40 mg for LDL>70  -TTE outpatient (ordered for)  -Long-term LDL goal 40-70, long-term BP goal 130/80, long-term A1c goal less than 7  -Follow-up with stroke neurology in 6 to 8 weeks      Thank you for this consult. No further stroke evaluation is recommended, so we will sign off. Please contact us with any additional questions.    The patient was discussed with the Stroke Staff Dr. Raymond.    Maribell Senior MD  Neurology Resident   _____________________________________________________    Clinically  "Significant Risk Factors Present on Admission                # Drug Induced Coagulation Defect: home medication list includes an anticoagulant medication  # Drug Induced Platelet Defect: home medication list includes an antiplatelet medication        # Anemia: based on hgb <11       # Obesity: Estimated body mass index is 31.57 kg/m  as calculated from the following:    Height as of this encounter: 1.778 m (5' 10\").    Weight as of this encounter: 99.8 kg (220 lb).                 Past Medical History    No past medical history on file.  Medications   Home Meds  Prior to Admission medications    Medication Sig Start Date End Date Taking? Authorizing Provider   amiodarone (PACERONE) 200 MG tablet Take 200 mg by mouth daily. 12/13/24 1/9/25  Unknown, Entered By History   aspirin (ASA) 81 MG chewable tablet Take 81 mg by mouth daily. 12/13/24   Unknown, Entered By History   colchicine (COLCRYS) 0.6 MG tablet Take 0.3 mg by mouth daily. 12/12/24   Unknown, Entered By History   metoprolol tartrate (LOPRESSOR) 25 MG tablet Take 25 mg by mouth 2 times daily. 12/12/24 1/11/25  Unknown, Entered By History   traZODone (DESYREL) 50 MG tablet Take  mg by mouth at bedtime. 12/16/24   Unknown, Entered By History   warfarin ANTICOAGULANT (COUMADIN) 2 MG tablet Take by mouth 3 mg every Rodriguez, W; 4 mg all other days or as directed. Your dose may change. Call [facility] [phone] with questions. 12/12/24   Unknown, Entered By History       Scheduled Meds  Current Facility-Administered Medications   Medication Dose Route Frequency Provider Last Rate Last Admin    Warfarin Dose Required Daily - Pharmacist Managed  1 each Oral See Admin Instructions Jess Hobbs MD           Infusion Meds  Current Facility-Administered Medications   Medication Dose Route Frequency Provider Last Rate Last Admin       Allergies   No Known Allergies       PHYSICAL EXAMINATION   Temp:  [97.8  F (36.6  C)] 97.8  F " (36.6  C)  Pulse:  [64-78] 78  Resp:  [16-20] 20  BP: ()/(50-66) 110/64  SpO2:  [93 %-100 %] 100 %    General Exam  General:  patient lying in bed without any acute distress    HEENT:  normocephalic/atraumatic  Cardio:  RRR  Pulmonary:  no respiratory distress  Abdomen:  soft  Extremities:  no edema  Skin:  intact     Neuro Exam  Mental Status:  alert, oriented x 3, follows commands, speech clear and fluent, naming and repetition normal  Cranial Nerves:  visual fields intact, dilated eyes after ophthalmology exam but reactive and equal pupils, EOMI with normal smooth pursuit, facial sensation intact and symmetric, facial movements symmetric, hearing not formally tested but intact to conversation, palate elevation symmetric and uvula midline, no dysarthria, shoulder shrug strong bilaterally, tongue protrusion midline  Motor:  normal muscle tone and bulk, no abnormal movements, able to move all limbs spontaneously, strength 5/5 throughout upper and lower extremities, no pronator drift  Reflexes:  toes down-going  Sensory:  light touch sensation intact and symmetric throughout upper and lower extremities, no extinction on double simultaneous stimulation   Coordination:  normal finger-to-nose and heel-to-shin bilaterally without dysmetria, rapid alternating movements symmetric  Station/Gait:  deferred    Stroke Scales    NIHSS  1a. Level of Consciousness 0-->Alert, keenly responsive   1b. LOC Questions 0-->Answers both questions correctly   1c. LOC Commands 0-->Performs both tasks correctly   2.   Best Gaze 0-->Normal   3.   Visual 0-->No visual loss   4.   Facial Palsy 0-->Normal symmetrical movements   5a. Motor Arm, Left 0-->No drift, limb holds 90 (or 45) degrees for full 10 secs   5b. Motor Arm, Right 0-->No drift, limb holds 90 (or 45) degrees for full 10 secs   6a. Motor Leg, Left 0-->No drift, leg holds 30 degree position for full 5 secs   6b. Motor Leg, right 0-->No drift, leg holds 30 degree position for  "full 5 secs   7.   Limb Ataxia 0-->Absent   8.   Sensory 0-->Normal, no sensory loss   9.   Best Language 0-->No aphasia, normal   10. Dysarthria 0-->Normal   11. Extinction and Inattention  0-->No abnormality   Total 0 (12/21/24 0741)       Imaging  I personally reviewed all imaging; relevant findings per HPI.    Labs Data   CBC  Recent Labs   Lab 12/20/24  1452 12/20/24  1102   WBC  --  7.9   RBC  --  2.62*   HGB 10.2* 8.2*   HCT  --  24.8*   PLT  --  406     Basic Metabolic Panel   Recent Labs   Lab 12/20/24  1102      POTASSIUM 4.2   CHLORIDE 98   CO2 25   BUN 15.0   CR 1.24*   GLC 91   BRITTANI 9.5     Liver Panel  No results for input(s): \"PROTTOTAL\", \"ALBUMIN\", \"BILITOTAL\", \"ALKPHOS\", \"AST\", \"ALT\", \"BILIDIRECT\" in the last 168 hours.  INR    Recent Labs   Lab Test 12/20/24  2108 12/20/24  1102   INR 2.06* 1.98*             "

## 2024-12-21 NOTE — CONSULTS
OPHTHALMOLOGY CONSULT NOTE      Patient: Esdras Varela  Consulted by: [facility]  Reason for Consult: Transient vision loss   Date of initial evaluation: 12/20/24    ASSESSMENT/PLAN:     Esdras Varela is a 49 year old male who presents with     # Recurrent amaurosis, left eye   # Possible BRAO, left eye   Patient with recent ascending aneurysm repair, as well as mitral and aortic valve replacements (12/5) now on warfarin who presents with recurrent episodes of transient painless vision loss in the inferior field of his left eye. His vision is now baseline, with no APD, normal IOP and full fields. His dilated fundus examination demonstrated a possible tiny refractile arterial deposit as the retinal arteries exit the optic nerve head concerning for embolus. There is no optic disc edema to suggest anterior ischemic optic neuropathy, and the patient does not have dry eyes. There is high risk for clot formation given his recent cardiac procedures and furthermore he has recently been subtherapeutic on warfarin (INR 1.7 on 12/16).       RECOMMENDATIONS:  - Neurology evaluation for stroke workup   - Retina clinic follow up later this week (message to clinic schedulers sent)    It is our pleasure to participate in this patient's care and treatment. Please contact us with any further questions or concerns.    Patient discussed with Dr. Yung.    Ron Anglin MD  Resident Physician, PGY-2  Department of Ophthalmology      HISTORY OF PRESENTING ILLNESS:     Esdras Varela is a 49 year old male who    Patient states he had two episodes of acute left eye vision loss in the inferior aspect of his visual field lasting about five minutes. The first episode occurred on 12/19 at rest. The second episode occurred around 9am on 12/20 while he was walking on the treadmill. He states that in both episodes the inferior aspect of his left eye vision became grayed out. There was no pain.     Patient has a complex  past medical history including recent ascending aortic aneurysm repair, mitral valve repair, and AV replacement (Bentall root replacement with On-X valve, MV repair, PVI, and LAAL with Dr. Morales), performed at [place], done on 12/5). He is anticoagulated on warfarin. Most recent INR still subtherapeutic (1.9 on 12/19/24, increased to 2.06 on 12/20/24). Hemoglobin level 8.2 on 12/20/24.     Ocular ROS is otherwise negative for vision loss, blurry vision, flashes of light, floaters, eye pain, redness, discharge, diplopia, painful eye movements, or change in color vision.     Review of systems were otherwise negative except for that which has been stated above.      OCULAR/MEDICAL/SURGICAL HISTORIES:     Past Ocular History:  Prior eye surgery/laser: None  Contact lens wear: None  Glasses: Readers  Eyedrops: None    Family History:  Not discussed    Social History:  Not discussed    No past medical history on file.    No past surgical history on file.    EXAMINATION:     Base Eye Exam       Visual Acuity (Snellen - Linear)         Right Left    Near sc 20/30 20/30              Tonometry (Tonopen, 12:32 AM)         Right Left    Pressure 16 20              Pupils         Shape React APD    Right Round Brisk None    Left Round Brisk None              Visual Fields         Left Right     Full Full              Extraocular Movement         Right Left     Full Full              Dilation       Both eyes: 1.0% Mydriacyl, 2.5% Darío Synephrine @ 12:32 AM                  Additional Tests       Color         Right Left    [place] 11/11 11/11                  Slit Lamp and Fundus Exam       External Exam         Right Left    External Normal Normal              Slit Lamp Exam         Right Left    Lids/Lashes Normal Normal    Conjunctiva/Sclera White and quiet White and quiet    Cornea Clear Clear    Anterior Chamber Deep and quiet Deep and quiet    Iris Round and reactive -> pharmacologically dilated Round and reactive ->  pharmacologically dilated    Lens Clear Clear    Anterior Vitreous Normal Normal              Fundus Exam         Right Left    Disc Pink, sharp, flat ? tiny refractile arterial deposit as retinal artery exits the optic nerve head    C/D Ratio 0.4 0.4    Macula Normal, sharp FLR, no lesions Normal, sharp FLR, no lesions    Vessels Normal, no sheathing Normal, no sheathing    Periphery Normal, no lesions Normal, no lesions                    Labs/Studies/Imaging Performed:     CTA head and neck:                                          IMPRESSION: Patent arteries in the head and neck without vascular  cutoff. No evidence of dissection. No aneurysm identified. No  significant stenosis.    CT head:  IMPRESSION:   No evidence of acute intracranial hemorrhage, mass, or  herniation.

## 2024-12-21 NOTE — PHARMACY-ANTICOAGULATION SERVICE
Clinical Pharmacy - Warfarin Dosing Consult     Pharmacy has been consulted to manage this patient s warfarin therapy.       INR   Date Value Ref Range Status   12/20/2024 1.98 (H) 0.85 - 1.15 Final     Assessment:  Patient has recent AAA repair + AV replacement ([place], discharged 12/5) on new start Coumadin + amiodarone.  Indication: mechanical AV   INR goal: 2-3   PTA regimen: Take by mouth 3 mg every Rodriguez, W; 4 mg all other days or as directed    Plan:  - Recommend warfarin 4 mg today.    - With timing of new start amiodarone, expect potential DDI of amio + warfarin to become more evident and potentially need adjustment to this home regimen if patient is continued to be cared for in hospital/admitted.    Pharmacy will monitor Esdras Varela daily and order warfarin doses to achieve specified goal.    Please contact pharmacy as soon as possible if the warfarin needs to be held for a procedure or if the warfarin goals change.       Tam Gupta, PharmD  Pharmacy Resident

## 2024-12-21 NOTE — ED NOTES
Bed: [facility]  Expected date:   Expected time:   Means of arrival:   Comments:  [facility] transfer, left eye vision less since 0930, VSS

## 2024-12-21 NOTE — ED PROVIDER NOTES
ED Provider Note  [facility]      History   No chief complaint on file.    HPI  Esdras Varela is a 49 year old mal, anticoagulated on Coumadin, with a history of aneurysm of ascending aorta, dilated aortic root, left bundle branch block, CHF, mitral valve prolapse, and heart murmur  who presents to the emergency department for vision loss. The patient states that he lost vision in his the lower left eye for approximately 5 minuets yesterday. He adds that he still had vision in his upper left eye. His vision then went back to normal.  He then had another episode of vision loss in his lower left eye today at 9am while he was at his cardiac rehab appointment. His vision again returned to baseline shortly after. He was told to come to the emergency department of further evaluation. He was seen in [facility] but was told to come to [facility] to be seen by ophthalmology. He has been taking his medications. He denies any pain on his incision, chest pain, issues breathing or weakness in his arms or legs.    Per chart review: The patient was admitted on 12/5/2024 for ascending aneurysm repair, Bentall root replacement with On-X valve, MV repair, PVI, and LAAL.He was transferred to the PCU on 12/9.  Discharge testing was performed on 12/11/24. Patient was able to discharge on 12/12/24 with further follow up in regard to diuresis and pleural effusions. He was given Pacerone, Asprin, Colcrys, Lasix, Lopressor, K-Tab and Warfarin at time of discharge. Today he was seen to at [facility] emergency department, patient had a CT/CTA, revealing no evidence of ICH, large vessel occlusion or other acute abnormality. Chest x-ray was also obtained, demonstrating patchy bibasilar opacities, left greater than right which may represent atelectasis. Initial high-sensitivity troponin did return elevated at 134 and on recheck it was 127. His hemoglobin was 8.2, down from previous values near 10. On recheck, this did  improved to 10.2. His creatine was elevated at 1.24, RBC count was 2.62, hematocrit was 24.8. His INR was 1.98 at 11:02am.     XR CHEST 2 VIEWS 12/20/2024:  Patchy bibasilar opacities left greater than right may  represent atelectasis. Correlate clinically with any evidence of  airspace disease such as pneumonia. Borderline cardiomegaly.  Sternotomy changes. No effusion or pneumothorax.    CT ANGIOGRAM OF THE HEAD AND NECK WITH CONTRAST 12/20/2024:  Patent arteries in the head and neck without vascular  cutoff. No evidence of dissection. No aneurysm identified. No  significant stenosis.    CT SCAN OF THE HEAD WITHOUT CONTRAST 12/20/2024:  No evidence of acute intracranial hemorrhage, mass, or  herniation.     POC US SOFT TISSUE ON 12/20/24:  No evidence of retinal detachment on bedside US     Past Medical History  No past medical history on file.  No past surgical history on file.  amiodarone (PACERONE) 200 MG tablet  aspirin (ASA) 81 MG chewable tablet  colchicine (COLCRYS) 0.6 MG tablet  metoprolol tartrate (LOPRESSOR) 25 MG tablet  traZODone (DESYREL) 50 MG tablet  warfarin ANTICOAGULANT (COUMADIN) 2 MG tablet      No Known Allergies  Family History  No family history on file.  Social History       Past medical history, past surgical history, medications, allergies, family history, and social history were reviewed with the patient. No additional pertinent items.   A complete review of systems was performed with pertinent positives and negatives noted in the HPI, and all other systems negative.    Physical Exam      Physical Exam  Constitutional:       Appearance: He is well-developed.   HENT:      Head: Normocephalic and atraumatic.   Eyes:      Extraocular Movements: Extraocular movements intact.      Conjunctiva/sclera: Conjunctivae normal.      Pupils: Pupils are equal, round, and reactive to light.   Cardiovascular:      Rate and Rhythm: Normal rate and regular rhythm.      Heart sounds: Normal heart sounds.       Comments: Well healing sternotomy scar.  Pulmonary:      Effort: Pulmonary effort is normal. No respiratory distress.      Breath sounds: No wheezing.   Abdominal:      General: There is no distension.      Palpations: Abdomen is soft.      Tenderness: There is no abdominal tenderness. There is no rebound.   Musculoskeletal:      Cervical back: Normal range of motion and neck supple.   Skin:     General: Skin is warm.   Neurological:      Mental Status: He is alert and oriented to person, place, and time.   Psychiatric:         Behavior: Behavior normal.         Thought Content: Thought content normal.           ED Course, Procedures, & Data      Procedures         Results for orders placed or performed during the hospital encounter of 12/20/24   Troponin T, High Sensitivity     Status: Abnormal   Result Value Ref Range    Troponin T, High Sensitivity 108 (HH) <=22 ng/L   INR     Status: Abnormal   Result Value Ref Range    INR 2.06 (H) 0.85 - 1.15   Troponin T, High Sensitivity     Status: Abnormal   Result Value Ref Range    Troponin T, High Sensitivity 108 (HH) <=22 ng/L     Medications   Warfarin Dose Required Daily - Pharmacist Managed (has no administration in time range)   warfarin ANTICOAGULANT (COUMADIN) tablet 4 mg (4 mg Oral $Given 12/20/24 2108)   metoprolol tartrate (LOPRESSOR) tablet 25 mg (25 mg Oral $Given 12/20/24 2241)   acetaminophen (TYLENOL) tablet 650 mg (650 mg Oral $Given 12/21/24 0020)            Results for orders placed or performed during the hospital encounter of 12/20/24   CTA Head Neck with Contrast     Status: None    Narrative    CT ANGIOGRAM OF THE HEAD AND NECK WITH CONTRAST  12/20/2024 1:07 PM     HISTORY: Transient left inferior vision loss.    TECHNIQUE:  CT angiography with an injection of 67 mL [id number]-370 IV  with scans through the head and neck. Images were transferred to a  separate 3-D workstation where multiplanar reformations and 3-D images  were created. Estimates  of carotid stenoses are made relative to the  distal internal carotid artery diameters except as noted. Radiation  dose for this scan was reduced using automated exposure control,  adjustment of the mA and/or kV according to patient size, or iterative  reconstruction technique.      COMPARISON: None.     CT HEAD FINDINGS: No contrast enhancing lesions. Cerebral blood flow  is grossly normal.     CT ANGIOGRAM HEAD FINDINGS:  The major intracranial arteries including  the proximal branches of the anterior cerebral, middle cerebral, and  posterior cerebral arteries appear patent without vascular cutoff. No  aneurysm identified. No significant stenosis. Venous circulation is  unremarkable.     CT ANGIOGRAM NECK FINDINGS: Normal origin of the great vessels from  the aortic arch.     Right carotid artery: The right common and internal carotid arteries  are patent. No significant stenosis or atherosclerotic disease in the  carotid artery.     Left carotid artery: The left common and internal carotid arteries are  patent. No significant stenosis or atherosclerotic disease in the  carotid artery.     Vertebral arteries: Vertebral arteries are patent without evidence of  dissection. No significant stenosis.     Other findings: There are degenerative changes in the spine.       Impression    IMPRESSION: Patent arteries in the head and neck without vascular  cutoff. No evidence of dissection. No aneurysm identified. No  significant stenosis.    JO GALINDO MD         SYSTEM ID:  [id number]   Head CT w/o contrast     Status: None    Narrative    CT SCAN OF THE HEAD WITHOUT CONTRAST   12/20/2024 1:04 PM     HISTORY: Transient left lower vision field loss.    TECHNIQUE:  Axial images of the head and coronal reformations without  IV contrast material. Radiation dose for this scan was reduced using  automated exposure control, adjustment of the mA and/or kV according  to patient size, or iterative reconstruction  technique.    COMPARISON: None.    FINDINGS: There is no evidence of intracranial hemorrhage, mass, acute  infarct or anomaly. The ventricles are normal in size, shape and  configuration. The brain parenchyma and subarachnoid spaces are  normal.     The visualized portions of the sinuses and mastoids appear normal. The  bony calvarium and bones of the skull base appear intact.       Impression    IMPRESSION:   No evidence of acute intracranial hemorrhage, mass, or  herniation.     JO GALINDO MD         SYSTEM ID:  [id number]   POC US SOFT TISSUE     Status: None    Impression    Limited Bedside ED Ultrasound for Ocular complaints Procedure Note:    PROCEDURE: PERFORMED BY: Dr. Pancho Palomo MD  INDICATIONS/SYMPTOM: visual field cut  PROBE: High frequency linear probe  FINDINGS:  Left eye:      Lens location: Normal   Retinal detachment: Absent   Posterior chamber hemorrhage: Absent   Intraocular foreign body: Absent    INTERPRETATION: No evidence of retinal detachment on bedside US  IMAGE DOCUMENTATION: Images were archived to PACs system.       Chest XR,  PA & LAT     Status: None    Narrative    XR CHEST 2 VIEWS   12/20/2024 1:25 PM     HISTORY: Recent thoracic surgery, anemia.    COMPARISON: None.      Impression    IMPRESSION: Patchy bibasilar opacities left greater than right may  represent atelectasis. Correlate clinically with any evidence of  airspace disease such as pneumonia. Borderline cardiomegaly.  Sternotomy changes. No effusion or pneumothorax.    TONY MENSAH MD         SYSTEM ID:  [id number]   [place] Draw     Status: None    Narrative    The following orders were created for panel order [place] Draw.  Procedure                               Abnormality         Status                     ---------                               -----------         ------                     Extra Blue Top Tube[[id number]]                              Final result               Extra Red Top Tube[[id number]]                                Final result               Extra Green Top (Lithium...[[id number]]                      Final result               Extra Purple Top Tube[[id number]]                            Final result               Extra Blood Bank Purple ...[[id number]]                      Final result               Extra Blood Bank Purple ...[[id number]]                      Final result                 Please view results for these tests on the individual orders.   Extra Blue Top Tube     Status: None   Result Value Ref Range    Hold Specimen JIC    Extra Red Top Tube     Status: None   Result Value Ref Range    Hold Specimen JIC    Extra Green Top (Lithium Heparin) Tube     Status: None   Result Value Ref Range    Hold Specimen JIC    Extra Purple Top Tube     Status: None   Result Value Ref Range    Hold Specimen JIC    Extra Blood Bank Purple Top Tube     Status: None   Result Value Ref Range    Hold Specimen JIC    Extra Blood Bank Purple Top Tube     Status: None   Result Value Ref Range    Hold Specimen JIC    INR     Status: Abnormal   Result Value Ref Range    INR 1.98 (H) 0.85 - 1.15   Basic metabolic panel     Status: Abnormal   Result Value Ref Range    Sodium 135 135 - 145 mmol/L    Potassium 4.2 3.4 - 5.3 mmol/L    Chloride 98 98 - 107 mmol/L    Carbon Dioxide (CO2) 25 22 - 29 mmol/L    Anion Gap 12 7 - 15 mmol/L    Urea Nitrogen 15.0 6.0 - 20.0 mg/dL    Creatinine 1.24 (H) 0.67 - 1.17 mg/dL    GFR Estimate 70 >60 mL/min/1.73m2    Calcium 9.5 8.8 - 10.4 mg/dL    Glucose 91 70 - 99 mg/dL   Troponin T, High Sensitivity     Status: Abnormal   Result Value Ref Range    Troponin T, High Sensitivity 134 (HH) <=22 ng/L   CBC with platelets and differential     Status: Abnormal   Result Value Ref Range    WBC Count 7.9 4.0 - 11.0 10e3/uL    RBC Count 2.62 (L) 4.40 - 5.90 10e6/uL    Hemoglobin 8.2 (L) 13.3 - 17.7 g/dL    Hematocrit 24.8 (L) 40.0 - 53.0 %    MCV 95 78 - 100 fL    MCH 31.3 26.5 - 33.0 pg    MCHC 33.1  31.5 - 36.5 g/dL    RDW 13.5 10.0 - 15.0 %    Platelet Count 406 150 - 450 10e3/uL    % Neutrophils 68 %    % Lymphocytes 19 %    % Monocytes 10 %    % Eosinophils 1 %    % Basophils 1 %    % Immature Granulocytes 0 %    NRBCs per 100 WBC 0 <1 /100    Absolute Neutrophils 5.4 1.6 - 8.3 10e3/uL    Absolute Lymphocytes 1.5 0.8 - 5.3 10e3/uL    Absolute Monocytes 0.8 0.0 - 1.3 10e3/uL    Absolute Eosinophils 0.1 0.0 - 0.7 10e3/uL    Absolute Basophils 0.1 0.0 - 0.2 10e3/uL    Absolute Immature Granulocytes 0.0 <=0.4 10e3/uL    Absolute NRBCs 0.0 10e3/uL   Troponin T, High Sensitivity     Status: Abnormal   Result Value Ref Range    Troponin T, High Sensitivity 127 (HH) <=22 ng/L   Hemoglobin     Status: Abnormal   Result Value Ref Range    Hemoglobin 10.2 (L) 13.3 - 17.7 g/dL   EKG 12-lead, tracing only     Status: None   Result Value Ref Range    Systolic Blood Pressure  mmHg    Diastolic Blood Pressure  mmHg    Ventricular Rate 71 BPM    Atrial Rate 71 BPM    NH Interval 202 ms    QRS Duration 106 ms     ms    QTc 460 ms    P Axis 43 degrees    R AXIS -33 degrees    T Axis 108 degrees    Interpretation ECG       Sinus rhythm with sinus arrhythmia  Left axis deviation  Nonspecific T wave abnormality  Abnormal ECG  No previous ECGs available  Unconfirmed report - interpretation of this ECG is computer generated - see medical record for final interpretation  Confirmed by - EMERGENCY ROOM, PHYSICIAN ([id number]),  Deon Novak ([id number]) on 12/20/2024 1:16:56 PM     Adult Type and Screen     Status: None   Result Value Ref Range    ABO/RH(D) O POS     Antibody Screen Negative Negative    SPECIMEN EXPIRATION DATE [id number]    CBC with platelets differential     Status: Abnormal    Narrative    The following orders were created for panel order CBC with platelets differential.  Procedure                               Abnormality         Status                     ---------                               -----------          ------                     CBC with platelets and d...[[id number]]  Abnormal            Final result                 Please view results for these tests on the individual orders.   ABO/Rh type and screen     Status: None    Narrative    The following orders were created for panel order ABO/Rh type and screen.  Procedure                               Abnormality         Status                     ---------                               -----------         ------                     Adult Type and Screen[[id number]]                            Final result                 Please view results for these tests on the individual orders.     Medications - No data to display  Labs Ordered and Resulted from Time of ED Arrival to Time of ED Departure - No data to display  No orders to display          Critical care was not performed.     Medical Decision Making  The patient's presentation was of high complexity (an acute health issue posing potential threat to life or bodily function).    The patient's evaluation involved:  review of external note(s) from 3+ sources (see separate area of note for details)  review of 3+ test result(s) ordered prior to this encounter (see separate area of note for details)  ordering and/or review of 3+ test(s) in this encounter (see separate area of note for details)  discussion of management or test interpretation with another health professional (see separate area of note for details)      The patient's management necessitated further care after sign-out to Dr. Varela (see their note for further management).    Assessment & Plan    Patient is a 49-year-old male who status post a aortic valve and mitral valve surgery at [facility] about 20 days prior currently on Coumadin who presents to the ER due to a transient right lower visual field loss.  Patient had a 5-minute episode today at a 5-minute episode yesterday.  Patient was seen in outside hospital and was evaluated by neurology.   They did a CT head and a CTA which was negative.  They sent the patient here to be evaluated by ophthalmology.  There was a delayed having ophthalmology come and evaluate the patient due to other issues on their service.  Patient was evaluated combined ophthalmology who thinks that he might have a branch retinal artery occlusion though is not sure because the findings are very mild.  Case was discussed with the retinal fellow who recommends a neuro evaluation here at this hospital.  Will therefore contact our neurology team to have them evaluate the patient.  Disposition will be according to neurology.  Patient will be sent to the overnight doctor for completion of care.    I have reviewed the nursing notes. I have reviewed the findings, diagnosis, plan and need for follow up with the patient.    New Prescriptions    No medications on file       Final diagnoses:   Transient visual loss of right eye   I, Elsi Garvey, am serving as a trained medical scribe to document services personally performed by Jess Hobbs MD, based on the provider's statements to me.     I, Jess Hobbs MD, was physically present and have reviewed and verified the accuracy of this note documented by Elsi Garvey.     Jess Hobbs MD  [facility] EMERGENCY DEPARTMENT  12/20/2024     Jess Hobbs MD  12/21/24 0136       Jess Hobbs MD  12/21/24 0141

## 2024-12-21 NOTE — DISCHARGE INSTRUCTIONS
You possibly have a branch retinal artery occlusion of you right eye.  You need to continue taking your coumadin.     You had a full ophthalmology and neurology evaluation in the ER.     You were given a referral to follow-up with neurology stroke clinic.  They have ordered an outpatient echocardiogram.    You should speak with your cardiologist to inquire about the need to schedule an outpatient transesophageal echocardiogram which is a more specific test that may be necessary to rule out a cause for embolic strokes.  You may schedule this through [facility] or [facility] but this has not been ordered from the emergency department so you will need to speak with a primary provider to get this scheduled.    Take aspirin 81 mg daily in addition to your normal anticoagulation medications.    Return for headache, confusion, weakness, numbness, chest pain, other signs of a stroke.      Please make an appointment to follow up with Neurology Clinic (phone: [phone]) in 4-6 weeks.    You were given a referral to Neurology Stroke clinic. Someone should call you to set up this appointment, but please call the number listed above, if you do not hear from someone within 1-2 business days.

## 2024-12-21 NOTE — ED TRIAGE NOTES
BIBA c/o temporary loss of vision in left eye that happened for 5 minutes while at PT  Pt went to [facility], ruled out for stroke, transferred here for eval.  Pt had open heart surgery on 12/5 to replace valves  VSS

## 2024-12-23 ENCOUNTER — TELEPHONE (OUTPATIENT)
Dept: OPHTHALMOLOGY | Facility: CLINIC | Age: 49
End: 2024-12-23
Payer: COMMERCIAL

## 2024-12-23 ENCOUNTER — HOSPITAL ENCOUNTER (OUTPATIENT)
Dept: CARDIAC REHAB | Facility: CLINIC | Age: 49
Discharge: HOME | End: 2024-12-23
Attending: THORACIC SURGERY (CARDIOTHORACIC VASCULAR SURGERY)
Payer: COMMERCIAL

## 2024-12-23 PROCEDURE — 93798 PHYS/QHP OP CAR RHAB W/ECG: CPT

## 2024-12-23 NOTE — TELEPHONE ENCOUNTER
Pt to follow up this week with Retina provider per on call eye provider    --Branch retina artery occlusion.    Ok for 12:30 PM this Thursday with Dr. Tien Vazquez per retina team at [facility] location    I tentatively scheduled the appointment to hold the time slot.    I will ask patient communicator to reach out to offer/confirm appt.    Angel Mendez RN 9:55 AM 12/23/24

## 2024-12-23 NOTE — TELEPHONE ENCOUNTER
Called and spoke to Shawn    He is not able to come in this week -     Can we do next week Monday or Friday per his wife -     Tiffanie Camilo Communication Facilitator on 12/23/2024 at 10:11 AM

## 2024-12-24 ENCOUNTER — HOSPITAL ENCOUNTER (OUTPATIENT)
Dept: CARDIAC REHAB | Facility: CLINIC | Age: 49
Discharge: HOME | End: 2024-12-24
Attending: THORACIC SURGERY (CARDIOTHORACIC VASCULAR SURGERY)
Payer: COMMERCIAL

## 2024-12-24 PROCEDURE — 93798 PHYS/QHP OP CAR RHAB W/ECG: CPT

## 2024-12-24 NOTE — TELEPHONE ENCOUNTER
Spoke to pt at 0820    Pt has primary medical care at [facility] and would prefer to see eye provider at [facility].    Pt will be seeing PCP soon to assist in referral if needed.    Pt aware may reach out to [facility] if having any trouble scheduling.    Angel Mendez RN 8:26 AM 12/24/24

## 2024-12-24 NOTE — TELEPHONE ENCOUNTER
Ok [facility] next Monday [facility] 1230 [facility]    T[facility] team will reach out review/confirm scheduling    Angel Mendez RN 7:49 AM 12/24/24

## 2024-12-27 ENCOUNTER — HOSPITAL ENCOUNTER (OUTPATIENT)
Dept: CARDIAC REHAB | Facility: CLINIC | Age: 49
Discharge: HOME | End: 2024-12-27
Attending: THORACIC SURGERY (CARDIOTHORACIC VASCULAR SURGERY)
Payer: COMMERCIAL

## 2024-12-27 PROCEDURE — 93798 PHYS/QHP OP CAR RHAB W/ECG: CPT

## 2024-12-30 ENCOUNTER — HOSPITAL ENCOUNTER (OUTPATIENT)
Dept: CARDIAC REHAB | Facility: CLINIC | Age: 49
Discharge: HOME | End: 2024-12-30
Attending: THORACIC SURGERY (CARDIOTHORACIC VASCULAR SURGERY)
Payer: COMMERCIAL

## 2024-12-30 PROCEDURE — 93798 PHYS/QHP OP CAR RHAB W/ECG: CPT

## 2024-12-31 ENCOUNTER — HOSPITAL ENCOUNTER (OUTPATIENT)
Dept: CARDIAC REHAB | Facility: CLINIC | Age: 49
Discharge: HOME | End: 2024-12-31
Attending: THORACIC SURGERY (CARDIOTHORACIC VASCULAR SURGERY)
Payer: COMMERCIAL

## 2024-12-31 PROCEDURE — 93798 PHYS/QHP OP CAR RHAB W/ECG: CPT

## 2025-03-08 ENCOUNTER — HEALTH MAINTENANCE LETTER (OUTPATIENT)
Age: 50
End: 2025-03-08